# Patient Record
Sex: FEMALE | Race: WHITE | NOT HISPANIC OR LATINO | Employment: UNEMPLOYED | URBAN - METROPOLITAN AREA
[De-identification: names, ages, dates, MRNs, and addresses within clinical notes are randomized per-mention and may not be internally consistent; named-entity substitution may affect disease eponyms.]

---

## 2024-07-01 ENCOUNTER — APPOINTMENT (OUTPATIENT)
Dept: RADIOLOGY | Facility: CLINIC | Age: 44
End: 2024-07-01

## 2024-10-15 ENCOUNTER — OFFICE VISIT (OUTPATIENT)
Dept: RHEUMATOLOGY | Facility: CLINIC | Age: 44
End: 2024-10-15
Payer: COMMERCIAL

## 2024-10-15 VITALS
HEART RATE: 89 BPM | DIASTOLIC BLOOD PRESSURE: 84 MMHG | SYSTOLIC BLOOD PRESSURE: 147 MMHG | WEIGHT: 201 LBS | HEIGHT: 63 IN | BODY MASS INDEX: 35.61 KG/M2

## 2024-10-15 DIAGNOSIS — R76.8 POSITIVE ANA (ANTINUCLEAR ANTIBODY): Primary | ICD-10-CM

## 2024-10-15 DIAGNOSIS — M79.7 FIBROMYALGIA: ICD-10-CM

## 2024-10-15 PROCEDURE — 4004F PT TOBACCO SCREEN RCVD TLK: CPT | Performed by: INTERNAL MEDICINE

## 2024-10-15 PROCEDURE — 3008F BODY MASS INDEX DOCD: CPT | Performed by: INTERNAL MEDICINE

## 2024-10-15 PROCEDURE — 99204 OFFICE O/P NEW MOD 45 MIN: CPT | Performed by: INTERNAL MEDICINE

## 2024-10-15 RX ORDER — TRAZODONE HYDROCHLORIDE 150 MG/1
1 TABLET ORAL NIGHTLY
COMMUNITY
Start: 2024-09-19

## 2024-10-15 RX ORDER — FLUOXETINE HYDROCHLORIDE 40 MG/1
1 CAPSULE ORAL
COMMUNITY
Start: 2024-02-28

## 2024-10-15 RX ORDER — LANOLIN ALCOHOL/MO/W.PET/CERES
1000 CREAM (GRAM) TOPICAL DAILY
COMMUNITY

## 2024-10-15 RX ORDER — TIOTROPIUM BROMIDE INHALATION SPRAY 3.12 UG/1
SPRAY, METERED RESPIRATORY (INHALATION)
COMMUNITY

## 2024-10-15 RX ORDER — CLOBETASOL PROPIONATE 0.5 MG/G
CREAM TOPICAL DAILY
COMMUNITY

## 2024-10-15 RX ORDER — CIPROFLOXACIN 500 MG/1
1 TABLET ORAL
COMMUNITY
Start: 2024-07-15

## 2024-10-15 RX ORDER — LEVALBUTEROL INHALATION SOLUTION 0.63 MG/3ML
SOLUTION RESPIRATORY (INHALATION)
COMMUNITY
Start: 2024-09-01

## 2024-10-15 RX ORDER — TIZANIDINE HYDROCHLORIDE 4 MG/1
4 CAPSULE, GELATIN COATED ORAL 3 TIMES DAILY
Qty: 90 CAPSULE | Refills: 1 | Status: SHIPPED | OUTPATIENT
Start: 2024-10-15 | End: 2025-10-15

## 2024-10-15 RX ORDER — LEVOFLOXACIN 750 MG/1
1 TABLET ORAL
COMMUNITY
Start: 2024-08-26

## 2024-10-15 RX ORDER — FOLIC ACID 1 MG/1
1 TABLET ORAL DAILY
COMMUNITY

## 2024-10-15 RX ORDER — GABAPENTIN 300 MG/1
CAPSULE ORAL
COMMUNITY
Start: 2024-09-18

## 2024-10-15 RX ORDER — LISDEXAMFETAMINE DIMESYLATE 30 MG/1
1 CAPSULE ORAL EVERY MORNING
COMMUNITY
Start: 2023-05-22

## 2024-10-15 RX ORDER — SODIUM CHLORIDE FOR INHALATION 3 %
VIAL, NEBULIZER (ML) INHALATION
COMMUNITY
Start: 2024-08-30

## 2024-10-15 RX ORDER — METHYLPREDNISOLONE 4 MG/1
TABLET ORAL
COMMUNITY
Start: 2024-10-12

## 2024-10-15 ASSESSMENT — PAIN SCALES - GENERAL: PAINLEVEL: 5

## 2024-10-15 NOTE — PROGRESS NOTES
May be a candidate for opioid sparing infusion  History of Present Complaint:  The patient was referred to us by Referring Provider: ***. this is 44 y.o.  female {Accompanied by:[id number]}with a past history of obesity BMI 36, smoker, ELISHA, anxiety/depression, PTSD, ADHD on Vyvanse 40 mg, fibromyalgia, history of opioid use disorder on buprenorphine 8 mg and gabapentin 100 mg twice daily from [facility] presenting with {kt mrdica symptoms:74813}    Pain started {pain onset:27888}  Pain is {Better or worse:76264}   Patient history significant for the following red flags: {Red flags:38095}  The pain is described as {Pain description:08590} and is relieved by {pain relief:46991}      Prior Pain Therapies: {Prior pain therapy:76359}    Past surgical history:  {Past surgical history:39062}           Procedures:   *** the patient has had a ***% improvement in pain.    Portions of record reviewed for pertinent issues: active problem list, medication list, allergies, family history, social history, notes from last encounter, encounters, lab results, imaging and other available records.    I have personally reviewed the OARRS report for this patient. This report is scanned into the electronic medical record. I have considered the risks of abuse, dependence, addiction and diversion. It showed: Buprenorphine 8 mg and gabapentin 100 mg twice daily from [facility] and Vyvanse 40 mg from [facility]   OPIOID RISK ASSESSMENT SCORE ***/26  Opioid agreement: ***  Activities of daily living: ***  Adverse effects: ***  Analgesia: W/O ***/10, W ***/10  {***}  Toxicology screen: ***  Aberrant behavior: ***      Diagnostic studies:  ***      Employment/disability/litigation: {ktlitigation:97733}    Social History:  {KT social history:21442}       Review of Systems       Physical Exam       Assessment  ***           Plan  At least 50% of the visit was involved in the discussion of the options for treatment. We discussed exercises,  medication, interventional therapies and surgery. Healthy life style is essential with patient hard work to achieve the wellness. In addition; discussion with the patient and/or family about any of the diagnostic results, impressions and/or recommended diagnostic studies, prognosis, risks and benefits of treatment options, instructions for treatment and/or follow-up, importance of compliance with chosen treatment options, risk-factor reduction, and patient/family education.         Recommended Pool therapy, walking in the pool, at least 3x per week for 30 minutes  Recommend self-directed physical therapy with at least daily exercises for minimum of 20-minute, brochure was handed to the patient  *** Smoking cessation  Healthy lifestyle and anti-inflammatory diet in addition to weight control discussed with the patient  Alternative chronic pain therapies was discussed, encouraged and information was handed  Return to Clinic 3 months       *Please note this report has been produced using speech recognition software and may contain errors related to that system including grammar, punctuation and spelling as well as words and phrases that may be inappropriate. If there are questions or concerns, please feel free to contact me to clarify.    Gregoria Lake MD

## 2024-10-15 NOTE — PROGRESS NOTES
44 y.o.        female            CHIEF COMPLAINT: Generalized stiffness and pain    HPI: 44 yr old WF who dates her history back to 2 years back when she noticed generalized stiffness and diffuse pain. The symptoms have gotten worse for last few months.  She suffers from major depression. Saw, Dr. Cardona for positive DERRICK in a titer of 1: 80. NICKY and anti-DNA is negative.   She has frequent headaches. Has photophobia and phonophobia.   Also, has severe constipation.         PMH: Depression and anxiety  Opoid addiction  DJD of the spine  Fibromyalgia           Allergies   Allergen Reactions    Bupropion Hcl Rash     Other reaction(s): Mental Status Change    Pregabalin Other     Other reaction(s): GI Upset    Duloxetine Anxiety     Increased agitation    Lamotrigine Rash     Rsh on arms       Medication Documentation Review Audit       Reviewed by Odilia Bazan MA (Medical Assistant) on 10/15/24 at 0935      Medication Order Taking? Sig Documenting Provider Last Dose Status   ciprofloxacin (Cipro) 500 mg tablet [id number] Yes Take 1 tablet (500 mg) by mouth every 12 hours. Historical Provider, MD Taking Active   clobetasol (Temovate) 0.05 % cream [id number] Yes Apply topically once daily. Historical Provider, MD Taking Active   cyanocobalamin (Vitamin B-12) 1,000 mcg tablet [id number] Yes Take 1 tablet (1,000 mcg) by mouth once daily. Historical MD Javad Taking Active   FLUoxetine (PROzac) 40 mg capsule [id number] Yes Take 1 capsule (40 mg) by mouth early in the morning.. Historical MD Javad Taking Active   folic acid (Folvite) 1 mg tablet [id number] Yes Take 1 tablet (1 mg) by mouth once daily. Historical Provider, MD Taking Active   gabapentin (Neurontin) 300 mg capsule [id number] Yes Take one pill by mouth every afternoon and evening Historical Provider, MD Taking Active   levalbuterol (Xopenex) 0.63 mg/3 mL nebulizer solution [id number] Yes USE 3 ML VIA NEBULIZER TWO TIMES A DAY. INHALE OVER  5-15 MINUTES Debbie Farnsworth MD Taking Active   levoFLOXacin (Levaquin) 750 mg tablet [id number] Yes Take 1 tablet (750 mg) by mouth early in the morning.. Debbie Farnsworth MD Taking Active   methylPREDNISolone (Medrol Dospak) 4 mg tablets [id number] Yes TAKE 6 TABLETS ON DAY 1 AS DIRECTED ON PACKAGE AND DECREASE BY 1 TAB EACH DAY FOR A TOTAL OF 6 DAYS Debbie Farnsworth MD Taking Active   sodium chloride 3 % nebulizer solution [id number] Yes USE 2 ML VIA NEBULIZER TWO TIMES A DAY. USE AFTER ALBUTEROL NEBS Debbie Farnsworth MD Taking Active   Spiriva Respimat 2.5 mcg/actuation inhaler [id number] Yes INHALE 2 PUFFS BY MOUTH AS INSTRUCTED ONCE DAILY. Debbie Farnsworth MD Taking Active   traZODone (Desyrel) 150 mg tablet [id number] Yes Take 1 tablet (150 mg) by mouth once daily at bedtime. Debbie Farnsworth MD Taking Active   Vyvanse 30 mg capsule [id number] Yes Take 1 capsule (30 mg) by mouth once daily in the morning. Historical MD Javad Taking Active                      Family history: Aunt and grand mother Meinerre's disease.   Grandmother has RA      Social History     Tobacco Use    Smoking status: Every Day     Current packs/day: 0.50     Types: Cigarettes    Smokeless tobacco: Never         Review of Systems    REVIEW OF SYSTEMS:  Constitutional: C/O fatigue  Skin:  No psoriasis or photosensitvity  Head: No headache, oral ulcers or hair loss  Neck: No difficulty swallowing or choking  Eyes: No dry eyes or iritis  Mouth: Has dry mouth  but no oral ulcers  Pulmonary: No wheezing, pleurisy or SOB  Cardiovascular: No chest pain or palpitations  Gastrointestinal: As H/P  Endocrine: No Raynaud's   Musculoskeletal: As H/P    All 10 review of systems negative      PHYSICAL EXAM:  Visit Vitals  /84   Pulse 89     General - NAD, sitting up in chair, well-groomed, pleasant, AAOx3  Head: Normocephalic, atraumatic  Eyes - PERRLA, EOMI. No conjunctiva injection.   Mouth/ENT - Moist oral and nasal  mucosa. No facial rash. Enlarged parotid gland. Adequate salivary pooling.  Cardiovascular - Normal S1, S2. Regular rate and rhythm. No murmurs or rubs.  Lungs - Symmetric chest expansion. Clear to auscultation bilaterally.   Skin - No rashes or ulcers. Skin warm and dry. No erythema on bilateral cheeks.  Abdomen - Soft, non-tender. No masses. Normal bowel sounds.  Extremities - No edema, cyanosis ,or clubbing  Neurological - Alert and oriented x 3,  grossly intact. No focal deficit.  Musculoskeletal -  EXAMJOINTDETAILED,  Shoulders: Full ROM, without pain, no swelling, warmth or tenderness.  Elbows: Full ROM, without pain, no swelling, warmth or tenderness.  Wrists: Full ROM, without pain, no swelling, warmth or tenderness.  MCP: No swelling, warmth or tenderness. Metacarpal squeeze negative  PIP: No swelling, warmth or tenderness.  DIP: No swelling, warmth or tenderness.  Hands : 5/5.                 Assessment/Plan: 44 yr old with symptoms suggestive of FMS and cental sensitization.   She cannot tolerate Lyrica and Cymbalta. Will refer to Pain for infusions. Start on Tizanidine.  I check labs to rule out autoimmune process like Sjogren's but my suspicion is low.       Lyn Small MD

## 2024-10-24 ENCOUNTER — APPOINTMENT (OUTPATIENT)
Dept: PAIN MEDICINE | Facility: CLINIC | Age: 44
End: 2024-10-24
Payer: COMMERCIAL

## 2025-01-30 ENCOUNTER — OFFICE VISIT (OUTPATIENT)
Dept: ORTHOPEDIC SURGERY | Facility: CLINIC | Age: 45
End: 2025-01-30
Payer: COMMERCIAL

## 2025-01-30 ENCOUNTER — HOSPITAL ENCOUNTER (OUTPATIENT)
Dept: RADIOLOGY | Facility: CLINIC | Age: 45
Discharge: HOME | End: 2025-01-30
Payer: COMMERCIAL

## 2025-01-30 VITALS — BODY MASS INDEX: 35.44 KG/M2 | HEIGHT: 63 IN | WEIGHT: 200 LBS

## 2025-01-30 DIAGNOSIS — M54.2 NECK PAIN: ICD-10-CM

## 2025-01-30 DIAGNOSIS — M54.12 CERVICAL RADICULOPATHY: Primary | ICD-10-CM

## 2025-01-30 PROCEDURE — 99204 OFFICE O/P NEW MOD 45 MIN: CPT | Performed by: ORTHOPAEDIC SURGERY

## 2025-01-30 PROCEDURE — 72050 X-RAY EXAM NECK SPINE 4/5VWS: CPT

## 2025-01-30 PROCEDURE — 3008F BODY MASS INDEX DOCD: CPT | Performed by: ORTHOPAEDIC SURGERY

## 2025-01-30 PROCEDURE — 99214 OFFICE O/P EST MOD 30 MIN: CPT | Performed by: ORTHOPAEDIC SURGERY

## 2025-01-30 ASSESSMENT — PAIN SCALES - GENERAL: PAINLEVEL_OUTOF10: 6

## 2025-01-30 ASSESSMENT — PAIN - FUNCTIONAL ASSESSMENT: PAIN_FUNCTIONAL_ASSESSMENT: 0-10
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
## 2025-02-04 NOTE — PROGRESS NOTES
No chief complaint on file.    History of Present Complaint:  The patient was referred to us by Referring Provider: Julio Solis MD . this is 44 y.o.  female  with a past history of anxiety/depression, PTSD and nightmares, ADHD/OCD on Vyvanse 40 mg, involved with behavioral health Dr. Kanner at [facility], polysubstance abuse, smoker, obesity BMI 37, fibromyalgia on gabapentin 300 mg 3 times daily opioid use disorder on buprenorphine 8 mg twice daily  presenting with Neck pain    Pain started *** due to {pain onset:13176}  Pain is {Better or worse:40051} {DESC; BETTER/WORSE:56630}     The pain is described as {Pain description:47737} and is relieved by {pain relief:77426}      Prior Pain Therapies: {Prior pain therapy:87280}  Past surgical history:  {Past surgical history:05105}        Employment/disability/litigation: {ktlitigation:26180}  Social history: {KT social history:28758}    Diagnostic studies:  xrays     Shyam Each Box that Applies Female Male   FAMILY HISTORY OF SUBSTANCE ABUSE  Shyam the boxes that applies   Alcohol ?  1    ? 3   Illegal drugs ?  2 ? 3   Rx drugs ?  4 ? 4   PERSONAL HISTORY OF SUBSTNACE ABUSE   Alcohol ?  3 ?  3   Illegal drugs ?  4 ?  4    Rx drugs ?  5 ?  5   Age Between 16-45 years ?  1 ?  1   History of Preadolescent Sexual Abuse ?  3 ?  0   PSYCHOLOGIC DISEASE   ADD, OCD, bipolar, schizophrenia   ?  2 ?  2   Depression ?  1 ?  1   Scoring Totals       Scoring (Risk)  0-3 - Low  4-7 - Moderate  8 - High  Opioid Risk Assessment Score ***/26
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
## 2025-02-06 ENCOUNTER — APPOINTMENT (OUTPATIENT)
Dept: PAIN MEDICINE | Facility: CLINIC | Age: 45
End: 2025-02-06
Payer: COMMERCIAL

## 2025-02-13 ENCOUNTER — OFFICE VISIT (OUTPATIENT)
Dept: PAIN MEDICINE | Facility: CLINIC | Age: 45
End: 2025-02-13
Payer: COMMERCIAL

## 2025-02-13 VITALS
SYSTOLIC BLOOD PRESSURE: 173 MMHG | OXYGEN SATURATION: 99 % | HEART RATE: 75 BPM | WEIGHT: 194 LBS | TEMPERATURE: 97.2 F | RESPIRATION RATE: 18 BRPM | HEIGHT: 63 IN | DIASTOLIC BLOOD PRESSURE: 97 MMHG | BODY MASS INDEX: 34.38 KG/M2

## 2025-02-13 DIAGNOSIS — M54.2 NECK PAIN: ICD-10-CM

## 2025-02-13 DIAGNOSIS — M54.12 CERVICAL RADICULOPATHY: ICD-10-CM

## 2025-02-13 DIAGNOSIS — M79.7 FIBROMYALGIA: ICD-10-CM

## 2025-02-13 PROCEDURE — 99204 OFFICE O/P NEW MOD 45 MIN: CPT | Performed by: ANESTHESIOLOGY

## 2025-02-13 PROCEDURE — 99214 OFFICE O/P EST MOD 30 MIN: CPT | Performed by: ANESTHESIOLOGY

## 2025-02-13 PROCEDURE — 3008F BODY MASS INDEX DOCD: CPT | Performed by: ANESTHESIOLOGY

## 2025-02-13 RX ORDER — BUPRENORPHINE AND NALOXONE 8; 2 MG/1; MG/1
FILM, SOLUBLE BUCCAL; SUBLINGUAL
COMMUNITY
Start: 2016-12-08

## 2025-02-13 RX ORDER — LIDOCAINE 50 MG/G
1 PATCH TOPICAL DAILY
Qty: 30 PATCH | Refills: 0 | Status: SHIPPED | OUTPATIENT
Start: 2025-02-13

## 2025-02-13 RX ORDER — GABAPENTIN 400 MG/1
400 CAPSULE ORAL EVERY 6 HOURS
Qty: 120 CAPSULE | Refills: 1 | Status: SHIPPED | OUTPATIENT
Start: 2025-02-13 | End: 2025-04-14

## 2025-02-13 SDOH — ECONOMIC STABILITY: FOOD INSECURITY: WITHIN THE PAST 12 MONTHS, YOU WORRIED THAT YOUR FOOD WOULD RUN OUT BEFORE YOU GOT MONEY TO BUY MORE.: NEVER TRUE

## 2025-02-13 SDOH — ECONOMIC STABILITY: FOOD INSECURITY: WITHIN THE PAST 12 MONTHS, THE FOOD YOU BOUGHT JUST DIDN'T LAST AND YOU DIDN'T HAVE MONEY TO GET MORE.: NEVER TRUE

## 2025-02-13 ASSESSMENT — ENCOUNTER SYMPTOMS
ADENOPATHY: 0
SHORTNESS OF BREATH: 1
NECK PAIN: 1
NUMBNESS: 1
DIFFICULTY URINATING: 0
DEPRESSION: 1
OCCASIONAL FEELINGS OF UNSTEADINESS: 1
LOSS OF SENSATION IN FEET: 1
FEVER: 0
BACK PAIN: 1
EYE PAIN: 0
BLOOD IN STOOL: 0

## 2025-02-13 ASSESSMENT — PATIENT HEALTH QUESTIONNAIRE - PHQ9
SUM OF ALL RESPONSES TO PHQ QUESTIONS 1-9: 21
1. LITTLE INTEREST OR PLEASURE IN DOING THINGS: NEARLY EVERY DAY
5. POOR APPETITE OR OVEREATING: NEARLY EVERY DAY
8. MOVING OR SPEAKING SO SLOWLY THAT OTHER PEOPLE COULD HAVE NOTICED. OR THE OPPOSITE, BEING SO FIGETY OR RESTLESS THAT YOU HAVE BEEN MOVING AROUND A LOT MORE THAN USUAL: NOT AT ALL
9. THOUGHTS THAT YOU WOULD BE BETTER OFF DEAD, OR OF HURTING YOURSELF: NOT AT ALL
4. FEELING TIRED OR HAVING LITTLE ENERGY: NEARLY EVERY DAY
SUM OF ALL RESPONSES TO PHQ9 QUESTIONS 1 AND 2: 6
6. FEELING BAD ABOUT YOURSELF - OR THAT YOU ARE A FAILURE OR HAVE LET YOURSELF OR YOUR FAMILY DOWN: NEARLY EVERY DAY
2. FEELING DOWN, DEPRESSED OR HOPELESS: NEARLY EVERY DAY
3. TROUBLE FALLING OR STAYING ASLEEP OR SLEEPING TOO MUCH: NEARLY EVERY DAY
7. TROUBLE CONCENTRATING ON THINGS, SUCH AS READING THE NEWSPAPER OR WATCHING TELEVISION: NEARLY EVERY DAY

## 2025-02-13 ASSESSMENT — COLUMBIA-SUICIDE SEVERITY RATING SCALE - C-SSRS
6. HAVE YOU EVER DONE ANYTHING, STARTED TO DO ANYTHING, OR PREPARED TO DO ANYTHING TO END YOUR LIFE?: NO
1. IN THE PAST MONTH, HAVE YOU WISHED YOU WERE DEAD OR WISHED YOU COULD GO TO SLEEP AND NOT WAKE UP?: NO
2. HAVE YOU ACTUALLY HAD ANY THOUGHTS OF KILLING YOURSELF?: NO

## 2025-02-13 ASSESSMENT — LIFESTYLE VARIABLES
HOW MANY STANDARD DRINKS CONTAINING ALCOHOL DO YOU HAVE ON A TYPICAL DAY: PATIENT DOES NOT DRINK
AUDIT-C TOTAL SCORE: 0
SKIP TO QUESTIONS 9-10: 1
TOTAL SCORE: 18
HOW OFTEN DO YOU HAVE SIX OR MORE DRINKS ON ONE OCCASION: NEVER
HOW OFTEN DO YOU HAVE A DRINK CONTAINING ALCOHOL: NEVER

## 2025-02-13 ASSESSMENT — PAIN SCALES - GENERAL
PAINLEVEL_OUTOF10: 6
PAINLEVEL_OUTOF10: 6

## 2025-02-13 ASSESSMENT — PAIN DESCRIPTION - DESCRIPTORS: DESCRIPTORS: ACHING

## 2025-02-13 ASSESSMENT — PAIN - FUNCTIONAL ASSESSMENT: PAIN_FUNCTIONAL_ASSESSMENT: 0-10

## 2025-02-13 NOTE — PROGRESS NOTES
"Chief Complain    New Patient Visit (For generalized body pain that's been going in for 10+ years and have been getting progressively worse. Deny neck and back surgery. B/l hip and knee pain. Have images on file at [facility], some at . Is having GI issues. Is currently on suboxone. Have an /MRI coming up. Was referred my Dr. Cifuentes for pain mangement.)    History Of Present Illness  Luana Burgos is a 44 y.o. female here for evaluation of neck pain and low back pain, radiating to bilateral shoulder. The patient has been experiencing these symptoms for last few years. The patient describes the pain as dull, \"bone pain\". The patient's current pain score is 5-6 on a scale from 0-10. The pain is worsened by  looking down  and is alleviated by nothing relieves the pain. Since the start of the symptoms the pain has been worse.    The patient denies any fever, chills, weight loss, weakness, numbness, bladder/ bowel incontinence, history of cancer, history of IV drug abuse, recent trauma.    Past Medical History  HTN  Hiatal Hernia  Interstitial lung disease  Psoriasis    Surgical History  Finger Surgeries    Social History  She reports that she has been smoking cigarettes. She has never used smokeless tobacco. No history on file for alcohol use and drug use.    Family History  Non contributory      Allergies  Bupropion hcl, Pregabalin, Duloxetine, and Lamotrigine    Review of Systems  Review of Systems   Constitutional:  Negative for fever.   HENT:  Negative for ear pain.    Eyes:  Negative for pain.   Respiratory:  Positive for shortness of breath.    Cardiovascular:  Negative for chest pain.   Gastrointestinal:  Negative for blood in stool.   Endocrine: Negative for cold intolerance.   Genitourinary:  Negative for difficulty urinating.   Musculoskeletal:  Positive for back pain and neck pain.   Skin:  Negative for rash.   Allergic/Immunologic: Negative for food allergies.   Neurological:  Positive for numbness. " "  Hematological:  Negative for adenopathy.   Psychiatric/Behavioral:  Negative for suicidal ideas.         Physical Exam  Physical Exam  Constitutional:       Appearance: Normal appearance.   HENT:      Head: Normocephalic and atraumatic.   Eyes:      Extraocular Movements: Extraocular movements intact.      Pupils: Pupils are equal, round, and reactive to light.   Cardiovascular:      Rate and Rhythm: Normal rate and regular rhythm.   Pulmonary:      Effort: Pulmonary effort is normal.   Abdominal:      Palpations: Abdomen is soft.   Musculoskeletal:      Cervical back: Neck supple.   Skin:     General: Skin is warm.   Neurological:      Mental Status: She is alert and oriented to person, place, and time.      Motor: Motor function is intact.      Coordination: Coordination is intact.      Gait: Gait is intact.      Deep Tendon Reflexes:      Reflex Scores:       Tricep reflexes are 2+ on the right side and 2+ on the left side.       Bicep reflexes are 2+ on the right side and 2+ on the left side.       Brachioradialis reflexes are 2+ on the right side and 2+ on the left side.       Patellar reflexes are 2+ on the right side and 2+ on the left side.       Achilles reflexes are 2+ on the right side and 2+ on the left side.  Psychiatric:         Mood and Affect: Mood normal.         Behavior: Behavior normal.           Last Recorded Vitals  Blood pressure (!) 173/97, pulse 75, temperature 36.2 °C (97.2 °F), resp. rate 18, height 1.6 m (5' 3\"), weight 88 kg (194 lb), SpO2 99%.    Reviewed Images  Reviewed and independently interpreted Xray x-ray cervical spine reveals loss of cervical lordosis, anterior osteophytosis at C4, C5, C6       Assessment/Plan   Encounter Diagnoses   Name Primary?    Fibromyalgia     Neck pain     Cervical radiculopathy         Luana Burgos is a 44 y.o. female here for evaluation of chronic neck pain radiating to bilateral shoulders and bilateral upper extremities.  She has been experiencing " the symptoms last several years or so.  Has a past medical history significant of interstitial lung disease, hypertension, substance use disorder for opioids currently on Suboxone.  Review of her recently done x-ray of cervical spine does reveal significant changes involving anterior osteophytosis at C4, 5 and 6 and loss of cervical lordosis.  On physical examination she does not have any focal neurological deficit.  Currently she is enrolled in physical therapy and also has an MRI of her cervical spine scheduled.  She is managing her pain with gabapentin 300 mg 3 times daily and 200 mg in the morning with modest benefit.  I would increase the gabapentin gradually to 400 mg every 6 hours as needed.  Would also add 5% lidocaine patch.  Counseled her extensively about importance of smoking cessation and healthy lifestyle.  Follow-up after completion of MRI cervical spine.        Jorge Diggs MD

## 2025-02-19 ENCOUNTER — HOSPITAL ENCOUNTER (OUTPATIENT)
Dept: RADIOLOGY | Facility: CLINIC | Age: 45
Discharge: HOME | End: 2025-02-19
Payer: COMMERCIAL

## 2025-02-19 DIAGNOSIS — M54.12 CERVICAL RADICULOPATHY: ICD-10-CM

## 2025-02-19 DIAGNOSIS — M54.2 NECK PAIN: ICD-10-CM

## 2025-02-19 PROCEDURE — 72141 MRI NECK SPINE W/O DYE: CPT

## 2025-02-20 ENCOUNTER — PATIENT MESSAGE (OUTPATIENT)
Dept: PAIN MEDICINE | Facility: CLINIC | Age: 45
End: 2025-02-20
Payer: COMMERCIAL

## 2025-02-20 DIAGNOSIS — M54.12 CERVICAL RADICULOPATHY: ICD-10-CM

## 2025-02-20 DIAGNOSIS — M79.7 FIBROMYALGIA: Primary | ICD-10-CM

## 2025-02-21 ENCOUNTER — APPOINTMENT (OUTPATIENT)
Dept: ORTHOPEDIC SURGERY | Facility: CLINIC | Age: 45
End: 2025-02-21
Payer: COMMERCIAL

## 2025-03-03 RX ORDER — METHOCARBAMOL 500 MG/1
500 TABLET, FILM COATED ORAL 3 TIMES DAILY PRN
Qty: 30 TABLET | Refills: 0 | Status: SHIPPED | OUTPATIENT
Start: 2025-03-03 | End: 2025-03-13

## 2025-03-03 RX ORDER — NALOXONE HYDROCHLORIDE 4 MG/.1ML
1 SPRAY NASAL AS NEEDED
Qty: 2 EACH | Refills: 0 | Status: SHIPPED | OUTPATIENT
Start: 2025-03-03

## 2025-03-07 ENCOUNTER — OFFICE VISIT (OUTPATIENT)
Dept: ORTHOPEDIC SURGERY | Facility: CLINIC | Age: 45
End: 2025-03-07
Payer: COMMERCIAL

## 2025-03-07 DIAGNOSIS — M54.12 CERVICAL RADICULOPATHY: Primary | ICD-10-CM

## 2025-03-07 PROCEDURE — 99215 OFFICE O/P EST HI 40 MIN: CPT | Performed by: ORTHOPAEDIC SURGERY

## 2025-03-07 NOTE — PROGRESS NOTES
Luana Burgos is a 44 y.o. female who presents for Follow-up of the Neck (MRI Review/MRI@ 2/19/25).    HPI:  44-year-old female here for follow-up of neck MRI results.  She denies any fever chills nausea vomiting night sweats.  She has no bowel or bladder complaints.    Physical exam:  Well-nourished, well kept.  No lymphangitis or lymphadenopathy in the examined extremities. Affect normal.  Alert and oriented X 3.  Coordination normal.  Patient can rise from a seated position, can sit from a standing position. Can stand on heels and toes with a lot of difficulty.  Patient is tender in the paraspinal musculature of the cervical spine. range of motion is mildly decreased secondary to some pain and stiffness no weakness no instability to muscle strength. examination of the upper extremities reveals no point tenderness, swelling, or deformity.  Range of motion of the shoulders, elbows, wrists, and fingers are full without crepitance, instability, or exacerbation of pain. Strength is diffusely weak through the upper extremity bilaterally, no specific weakness in any single muscle group. no redness, abrasions, or lesions on the upper extremities bilaterally.  Gross sensation intact to the extremities.  Deep tendon reflexes 1+ and symmetric bilaterally.  [place] negative.     Imaging studies:  An MRI of the cervical spine was performed on February 19, 2025.  We reviewed that.    Assessment:  44-year-old female here for follow-up cervical MRI results.  See previous notes for details.  She is having a lot of posterior neck pain and parascapular pain.  She is getting some left arm radicular pain and numbness as well.  She recently saw her pain management physician that added some medication such as Robaxin and lidocaine patches and increased her gabapentin.  She feels like this is helping a little bit but that it may just be masking the pain.  She has some diffuse weakness throughout the upper extremity, a lot of this is a  pain guarding response.  She has systemic pain and she has had this all over her body for years.  We were working her up for her neck and upper extremity radicular symptoms.  She has not yet done physical therapy for her neck, she is setting that up now.  She has got some central and left-sided stenosis at C5-6 with a large bulging disc and degenerative level, to a lesser degree C6-7 as well.    We have reviewed tests today, MRI.  She is here with a family member who is a helpful and necessary historian.  I reviewed her pain management notes from Dr. Diggs from February 13, 2025, this discusses her increased medication.  This is an exacerbation of a chronic problem that is severely inhibiting her bodily function.  We did consider and discuss surgery today    For complete plan and/or surgical details, please refer to Dr. Solis's portion of this split dictation.    -Juan Nava PA-C    In a face-to-face encounter, I performed a history and physical examination, discussed pertinent diagnostic studies if indicated, and discussed diagnosis and management strategies with both the patient and the midlevel provider.  I reviewed the midlevel's note and agree with the documented findings and plan of care.    Neck pain left arm radiculopathy.  The patient has a milieu of other conditions she does have a history of subs abuse but she been clean for quite some time now.  She has degenerative disc at C5-6 and C6-7 and a little bit at C4-5.  We had an MRI of her cervical spine.  The MRI does not show any stenosis or significant abnormalities at C4-5 but there is some stenosis at C5-6 and C6-7 and degenerative change those levels.  The main stenosis at C5-6 but there is some at C6-7 as well.    Assessment/plan: Patient with left arm radiculopathy and neck pain likely coming from C5-6 and C6-7.  At some point we could perform an ACDF.  We did discuss and consider that today.  She is severely inhibited bodily function as  she has complete total body pain and she has been seeing pain management and neurology for that.  Neurology's been working her up in the getting some success.  I think it is too premature for us to proceed on with surgery at this point.  I like to get her into some physical therapy and we will see her back in 2 months.  If she is no better at that point we could consider an operation on her which be a C5-6 C6-7 ACDF.  However, she needs to understand that she has a high risk of failure given her prior history of substance abuse as well as her other chronic pain issues.    Julio Solis MD  Orthopedic surgery

## 2025-03-12 DIAGNOSIS — M54.12 CERVICAL RADICULOPATHY: ICD-10-CM

## 2025-03-12 DIAGNOSIS — M54.2 NECK PAIN: ICD-10-CM

## 2025-03-17 ENCOUNTER — APPOINTMENT (OUTPATIENT)
Dept: ORTHOPEDIC SURGERY | Facility: CLINIC | Age: 45
End: 2025-03-17
Payer: COMMERCIAL

## 2025-04-07 ENCOUNTER — APPOINTMENT (OUTPATIENT)
Dept: ORTHOPEDIC SURGERY | Facility: CLINIC | Age: 45
End: 2025-04-07
Payer: COMMERCIAL

## 2025-04-15 DIAGNOSIS — M54.2 NECK PAIN: ICD-10-CM

## 2025-04-15 DIAGNOSIS — M54.12 CERVICAL RADICULOPATHY: ICD-10-CM

## 2025-04-15 RX ORDER — GABAPENTIN 400 MG/1
400 CAPSULE ORAL EVERY 6 HOURS
Qty: 120 CAPSULE | Refills: 1 | Status: SHIPPED | OUTPATIENT
Start: 2025-04-15 | End: 2025-06-14

## 2025-04-18 ENCOUNTER — APPOINTMENT (OUTPATIENT)
Dept: GASTROENTEROLOGY | Facility: CLINIC | Age: 45
End: 2025-04-18
Payer: COMMERCIAL

## 2025-05-09 ENCOUNTER — APPOINTMENT (OUTPATIENT)
Dept: ORTHOPEDIC SURGERY | Facility: CLINIC | Age: 45
End: 2025-05-09
Payer: COMMERCIAL

## 2025-05-14 ENCOUNTER — APPOINTMENT (OUTPATIENT)
Facility: CLINIC | Age: 45
End: 2025-05-14
Payer: COMMERCIAL

## 2025-05-29 ENCOUNTER — APPOINTMENT (OUTPATIENT)
Dept: CARDIOLOGY | Facility: CLINIC | Age: 45
End: 2025-05-29
Payer: COMMERCIAL

## 2025-05-29 VITALS
SYSTOLIC BLOOD PRESSURE: 160 MMHG | TEMPERATURE: 97.2 F | WEIGHT: 194.2 LBS | BODY MASS INDEX: 34.41 KG/M2 | DIASTOLIC BLOOD PRESSURE: 90 MMHG | HEIGHT: 63 IN | HEART RATE: 90 BPM

## 2025-05-29 DIAGNOSIS — I10 ESSENTIAL (PRIMARY) HYPERTENSION: ICD-10-CM

## 2025-05-29 DIAGNOSIS — J98.4 MULTIPLE IDIOPATHIC PULMONARY CYSTS: ICD-10-CM

## 2025-05-29 DIAGNOSIS — R00.2 PALPITATIONS: ICD-10-CM

## 2025-05-29 DIAGNOSIS — R05.3 CHRONIC COUGH: ICD-10-CM

## 2025-05-29 DIAGNOSIS — R07.89 ATYPICAL CHEST PAIN: ICD-10-CM

## 2025-05-29 DIAGNOSIS — R06.02 SHORTNESS OF BREATH: Primary | ICD-10-CM

## 2025-05-29 PROBLEM — R60.0 LOCALIZED EDEMA: Status: ACTIVE | Noted: 2025-05-29

## 2025-05-29 PROBLEM — K59.09 CHRONIC CONSTIPATION: Status: ACTIVE | Noted: 2025-05-29

## 2025-05-29 PROCEDURE — 3077F SYST BP >= 140 MM HG: CPT | Performed by: INTERNAL MEDICINE

## 2025-05-29 PROCEDURE — 99205 OFFICE O/P NEW HI 60 MIN: CPT | Performed by: INTERNAL MEDICINE

## 2025-05-29 PROCEDURE — 93000 ELECTROCARDIOGRAM COMPLETE: CPT | Performed by: INTERNAL MEDICINE

## 2025-05-29 PROCEDURE — 3008F BODY MASS INDEX DOCD: CPT | Performed by: INTERNAL MEDICINE

## 2025-05-29 PROCEDURE — 3080F DIAST BP >= 90 MM HG: CPT | Performed by: INTERNAL MEDICINE

## 2025-05-29 RX ORDER — LOSARTAN POTASSIUM 25 MG/1
25 TABLET ORAL
COMMUNITY
Start: 2025-01-31 | End: 2025-05-29 | Stop reason: WASHOUT

## 2025-05-29 RX ORDER — VILAZODONE HYDROCHLORIDE 40 MG/1
TABLET ORAL
COMMUNITY

## 2025-05-29 RX ORDER — LISDEXAMFETAMINE DIMESYLATE 40 MG/1
CAPSULE ORAL
COMMUNITY

## 2025-05-29 RX ORDER — NALOXEGOL OXALATE 25 MG/1
TABLET, FILM COATED ORAL
COMMUNITY
Start: 2024-10-22 | End: 2025-05-29 | Stop reason: WASHOUT

## 2025-05-29 RX ORDER — LEVALBUTEROL TARTRATE 45 UG/1
AEROSOL, METERED ORAL
COMMUNITY

## 2025-05-29 RX ORDER — ASPIRIN 325 MG
50000 TABLET, DELAYED RELEASE (ENTERIC COATED) ORAL WEEKLY
COMMUNITY
Start: 2023-11-21

## 2025-05-29 RX ORDER — DULOXETIN HYDROCHLORIDE 30 MG/1
CAPSULE, DELAYED RELEASE ORAL
COMMUNITY
Start: 2025-03-20

## 2025-05-29 RX ORDER — FLUTICASONE PROPIONATE 50 MCG
SPRAY, SUSPENSION (ML) NASAL
COMMUNITY

## 2025-05-29 RX ORDER — METOPROLOL SUCCINATE 25 MG/1
25 TABLET, EXTENDED RELEASE ORAL
COMMUNITY
Start: 2025-03-06 | End: 2025-09-02

## 2025-05-29 RX ORDER — ERGOCALCIFEROL 1.25 MG/1
CAPSULE ORAL
COMMUNITY
End: 2025-05-29 | Stop reason: WASHOUT

## 2025-05-29 RX ORDER — LOSARTAN POTASSIUM AND HYDROCHLOROTHIAZIDE 12.5; 5 MG/1; MG/1
1 TABLET ORAL DAILY
Qty: 90 TABLET | Refills: 1 | Status: SHIPPED | OUTPATIENT
Start: 2025-05-29 | End: 2025-11-25

## 2025-05-29 ASSESSMENT — ENCOUNTER SYMPTOMS
STRIDOR: 0
ABDOMINAL DISTENTION: 1
LIGHT-HEADEDNESS: 1
WEAKNESS: 1
BRUISES/BLEEDS EASILY: 0
WHEEZING: 0
FATIGUE: 1
UNEXPECTED WEIGHT CHANGE: 1
DIZZINESS: 1
ACTIVITY CHANGE: 1
NERVOUS/ANXIOUS: 1
DIFFICULTY URINATING: 1
BACK PAIN: 1
COUGH: 1
SHORTNESS OF BREATH: 1

## 2025-05-29 NOTE — PROGRESS NOTES
CARDIOLOGY NEW PATIENT OFFICE VISIT    Patient:  Luana Burgos  YOB: 1980  MRN: [id number]       Chief Complaint/Active Symptoms:       Luana Burgos is a 44 y.o. female who is being seen today at the request of Dr. Rodriguez for evaluation of possible heart failure.    Chief Complaint   Patient presents with    Possible Heart Failure       History of Present Illness:   HPI    Was told by doctor 5 years ago when she had pitting edema that she was in the early stages of heart failure.    Now she feels like her legs are atrophying and has occasional pitting edema. Also has progressive hair loss, feels a vice  on her chest 24 hours per day. Has extreme fatigue. Has shortness of breath when laying on her back as she feels she has a weight on her chest when she does that. Has SOB one exertion but if she is at bed rest for a few days she feels SOB when she is active. No clear orthpnea or PND.     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 Stopped using in 2016.   Allergies:     Allergies[1]     Outpatient Medications:     Current Outpatient Medications   Medication Instructions    buprenorphine-naloxone (Suboxone) 8-2 mg SL film     cholecalciferol (VITAMIN D-3) 50,000 Units, Weekly    clobetasol (Temovate) 0.05 % cream Daily    cyanocobalamin (VITAMIN B-12) 1,000 mcg, Daily    DULoxetine (Cymbalta) 30 mg DR capsule 1 cap every day.  May incr to 60 mg (2 capsules). If more effective, call office to make this increase permanent.    fluticasone (Flonase) 50 mcg/actuation nasal spray PLACE 1 SPRAY IN BOTH NOSTRILS 2 (TWO) TIMES A DAY    folic acid (FOLVITE) 1 mg, Daily    gabapentin (NEURONTIN) 400 mg, oral, Every 6 hours    levalbuterol (Xopenex) 45 mcg/actuation inhaler INHALE 1 TO 2 PUFFS BY MOUTH EVERY 4 HOURS AS NEEDED FOR WHEEZE    lidocaine (Lidoderm) 5 % patch 1 patch, transdermal, Daily, Remove & discard patch within 12 hours or as directed by MD.    losartan  (COZAAR) 25 mg, Daily RT    methocarbamol (ROBAXIN) 500 mg, oral, 3 times daily PRN    metoprolol succinate XL (TOPROL-XL) 25 mg, Daily before breakfast    naloxone (NARCAN) 4 mg, nasal, As needed, May repeat every 2-3 minutes if needed, alternating nostrils, until medical assistance becomes available.    sodium chloride 3 % nebulizer solution USE 2 ML VIA NEBULIZER TWO TIMES A DAY. USE AFTER ALBUTEROL NEBS    Spiriva Respimat 2.5 mcg/actuation inhaler INHALE 2 PUFFS BY MOUTH AS INSTRUCTED ONCE DAILY.    traZODone (Desyrel) 150 mg tablet 1 tablet, Nightly    vilazodone (Viibryd) 40 mg tablet oral    Vyvanse 40 mg capsule TAKE 1 CAPSULE BY MOUTH EVERY MORNING PATIENT NEEDS BRAND NAME DUE TO INTOLERANCE TO GENERIC.        Past Medical History:     Medical History[2]    Social History:     Social History[3]    Family History:      Family History[4]    Review of Systems:     Review of Systems   Constitutional:  Positive for activity change, fatigue and unexpected weight change.   HENT:  Positive for congestion.    Respiratory:  Positive for cough (dark colored sputum) and shortness of breath. Negative for wheezing and stridor.    Cardiovascular:  Positive for leg swelling. Negative for chest pain.   Gastrointestinal:  Positive for abdominal distention.   Genitourinary:  Positive for difficulty urinating (foul odor).   Musculoskeletal:  Positive for back pain.   Neurological:  Positive for dizziness, weakness and light-headedness. Negative for syncope.   Hematological:  Does not bruise/bleed easily.   Psychiatric/Behavioral:  The patient is nervous/anxious.    All other systems reviewed and are negative.      Objective:     Vitals:    05/29/25 1325   BP: 160/90   Pulse: 90   Temp: 36.2 °C (97.2 °F)     /90 (05/29/25 1325)    Temp 36.2 °C (97.2 °F) (05/29/25 1325)    Pulse 90 (05/29/25 1325)   Resp      SpO2          Vitals:    05/29/25 1325   Weight: 88.1 kg (194 lb 3.2 oz)       Physical Examination:   GENERAL:   "Well developed, well nourished, in no acute distress.  HEENT: NC AT EOMI with anicteric sclera  NECK:  Supple, no JVD, no bruit.  CHEST:  Symmetric and nontender.  LUNGS:  Normal respiratory effort. Diminished air entry all lung fields.    HEART:  PMI nondisplaced. Rate and rhythm regular with no evident murmur, no gallop appreciated. There are no rubs, clicks or heaves.  PERIPHERAL VASCULAR:  Pulses present and equally palpable; 2+ throughout.  ABDOMEN: Soft, NT, ND without HSM or palpable organomegaly, no bruits, hypoactive bowel sounds  MUSCULOSKELETAL: No significant joint or limb deformity  EXTREMITIES:  Warm with good color, no clubbing or cyanosis.  There is no edema noted.  NEURO/PSYCH:  Alert and oriented times three with approppriate behavior and responses.  LYMPH: no significant palpable lymphadenopathy  SKIN: Small patches of erythema on extensor surfaces.      Lab:     CBC:   No results found for: \"WBC\", \"RBC\", \"HGB\", \"HCT\", \"PLT\"   No results found for: \"WBC\", \"RBC\", \"HGB\", \"HCT\", \"MCV\", \"MCH\", \"MCHC\", \"RDW\", \"PLT\", \"MPV\"    CMP:    No results found for: \"NA\", \"K\", \"CL\", \"CO2\", \"BUN\", \"CREATININE\", \"AGRATIO\", \"GLUCOSE\", \"GLU\", \"CALCIUM\"  No results found for: \"NA\", \"K\", \"CL\", \"CO2\", \"BUN\", \"CREATININE\", \"GLU\"  No results found for: \"ALKPHOS\", \"ALT\", \"AST\", \"PROT\", \"BILITOT\", \"BILIDIR\"    Magnesium:    No results found for: \"MG\"    Lipid Profile:    No results found for: \"CHLPL\", \"TRIG\", \"HDL\", \"LDLCALC\", \"LDLDIRECT\"    TSH:    No results found for: \"TSH\"    BNP:   No results found for: \"BNP\"     PT/INR:    No results found for: \"PROTIME\", \"INR\"    HgBA1c:    No results found for: \"HGBA1C\"    Cardiac Enzymes:    No results found for: \"TROPHS\"    Last CMP 11/2024 at [facility] unremarkable, no CBC, lipid panel there, no BNP drawn.    Diagnostic Studies:   EKG: NSR, LAHB, borderline prolonged QT    No echocardiogram results found for the past 12 months    No nuclear medicine results found for the past 12 " months    No valid procedures specified.    Radiology:     No valid procedures specified.   CT chest at [facility]  Multifocal at least moderate air trapping in all lung lobes with mild   excessive dynamic collapse of the mainstem bronchi (as well as the distal   trachea) and diffuse bronchial wall thickening in both lungs during   expiration.     A few scattered thin-walled lung cysts without appreciable mural nodules   again noted in the bilateral mid to lower lung zones.  No evidence of   fibrotic interstitial lung disease.     No suspicious lung nodules and no thoracic lymphadenopathy.   No CM, no coronary calcifications, no pericardial effusion    Stable 1.0 cm left adrenal nodule, likely benign.   Assessment:     Problem List Items Addressed This Visit       Shortness of breath - Primary    Relevant Orders    ECG 12 lead (Clinic Performed) (Completed)    CBC and Auto Differential    B-Type Natriuretic Peptide    Transthoracic echo (TTE) complete    Follow Up In Cardiology    Atypical chest pain    Relevant Orders    Comprehensive Metabolic Panel    Lipid Panel    Transthoracic echo (TTE) complete    Follow Up In Cardiology    Palpitations    Relevant Orders    TSH with reflex to Free T4 if abnormal    Magnesium    Transthoracic echo (TTE) complete    Chronic cough    Relevant Orders    CBC and Auto Differential    Multiple idiopathic pulmonary cysts    Relevant Orders    Alpha-1-Antitrypsin    Essential (primary) hypertension    Relevant Medications    losartan-hydrochlorothiazide (Hyzaar) 50-12.5 mg tablet    Other Relevant Orders    Follow Up In Cardiology       Plan:   1.  Shortness of breath.  Her shortness of breath appears to be more pulmonary related than signs or symptoms of heart failure.  We have requested an echocardiogram and we have encouraged the patient to continue to work with her pulmonologist.  We have requested a BN peptide for when she has her labs drawn as well as a CBC with differential to see  if she has any significant anemia or if her blood test for heart failure is elevated.  We have requested an echo for this as well as other reasons to look at her LV structure and function.  2.  Atypical chest pain.  Her chest discomfort is not suggestive of angina pectoris.  Although her father had heart disease it was not at a very young age.  She had a CT scan of the chest at the [facility] that showed no coronary calcifications so for now given she is under the age of 52 I have not requested a CT cardiac score but we will check her lipid panel for assessment of other cardiovascular risk factors that may need to be addressed.  3.  Chronic cough.  Likely related to her pulmonary disease and possible asthma have deferred to her primary care physician.  She is intermittently productive of dark-colored sputum and her CT scan showed signs of bronchiectasis.  I have referred her back to her PCP as well as pulmonologist for any alterations in her chronic respiratory therapy.  4.  Palpitations.  Benign by description.  For now would follow her clinically if they persist or sound associated with dizziness or lightheadedness would consider a 24 to 48-hour Holter monitor.  5.  Hypertension.  Blood pressure was repeated and remained elevated.  We have started her on an ARB with hydrochlorothiazide and we have told her to make sure she has her labs drawn within 2 weeks of starting this medication so we can assess her renal function and her electrolytes.  She has no old labs done in the system and she is told it is very important that she have her laboratory evaluation for her multiple problems done as well.  6.  Multiple idiopathic pulmonary cysts.  This is concerning for the possibility of alpha 1 antitrypsin deficiency.  I have requested a level.  I have encouraged her to discuss these other issues again with her pulmonologist.    I have encouraged the patient to have her echo done as soon as possible and have her labs  drawn within the next 7 to 10 days.  She has been instructed to start the losartan with hydrochlorothiazide for antihypertensive control.  I have asked her to see me in follow-up after the echocardiogram and having her lab work done and we will reassess her symptoms and her blood pressure readings at that time.  In terms of her anxiety and multiple other complaints she has been referred back to her psychiatrist and primary care physician.  She is on multiple psychiatrically active medications in a patient with a history of known multisubstance abuse in the past.  Would be cautious with this and her addictive personality.         [1]   Allergies  Allergen Reactions    Bupropion Other and Rash     Other reaction(s): Mental Status Change      Other Reaction(s): Mental Status Change    Other reaction(s): Mental Status Change      Other reaction(s): Mental Status Change  Other Reaction(s): Mental Status Change    Bupropion Hcl Rash     Other reaction(s): Mental Status Change    Lidocaine Anaphylaxis, Other and Rash     Other Reaction(s): Dyspnea, Rash    Metoclopramide Other     Other reaction(s): Other   Other reaction(s): Dystonia    Other reaction(s): severe pain    Other Reaction(s): dystonic reaction, Other, Other (See Comments), severe pain      Other reaction(s): Other Other reaction(s): Dystonia      Other reaction(s): severe pain    Sulfamethoxazole-Trimethoprim Itching and Nausea/vomiting    Clomipramine Unknown     Palpitations, nausea     Palpitations, nausea    Desmopressin Other    Naproxen GI Upset     Other Reaction(s): GI Upset    Pregabalin Other     Other reaction(s): GI Upset    Duloxetine Anxiety     Increased agitation    Lamotrigine Rash     Rsh on arms   [2] No past medical history on file.  [3]   Social History  Socioeconomic History    Marital status:    Tobacco Use    Smoking status: Every Day     Current packs/day: 0.50     Types: Cigarettes    Smokeless tobacco: Never     Social  Drivers of Health     Financial Resource Strain: At Risk (2024)    Received from [employer]    Financial Resource Strain     Financial Resource Strain: 2   Food Insecurity: No Food Insecurity (2025)    Hunger Vital Sign     Worried About Running Out of Food in the Last Year: Never true     Ran Out of Food in the Last Year: Never true   Transportation Needs: Not at Risk (2024)    Received from [facility]    Transportation Needs     Transportation: 1   Physical Activity: Patient Declined (2023)    Received from [facility]    Exercise Vital Sign     Days of Exercise per Week: Patient declined     Minutes of Exercise per Session: Patient declined   Stress: At Risk (2024)    Received from [facility]    Stress     Stress: 2   Social Connections: At Risk (2024)    Received from [facility]    Social Connections     Connectedness: 2   Intimate Partner Violence: Patient Declined (2023)    Received from [facility]    Humiliation, Afraid, Rape, and Kick questionnaire     Fear of Current or Ex-Partner: Patient declined     Emotionally Abused: Patient declined     Physically Abused: Patient declined     Sexually Abused: Patient declined   Housing Stability: At Risk (2024)    Received from [facility]    Housing Stability     Housin   [4] No family history on file.

## 2025-05-29 NOTE — PATIENT INSTRUCTIONS
Start losartan with hydrochlorothiazide 50/12.5 mg daily  Get labs drawn in next week  Get echo    Follow-up after echo and labs.

## 2025-06-08 DIAGNOSIS — M54.12 CERVICAL RADICULOPATHY: ICD-10-CM

## 2025-06-08 DIAGNOSIS — M54.2 NECK PAIN: ICD-10-CM

## 2025-06-09 RX ORDER — GABAPENTIN 400 MG/1
400 CAPSULE ORAL EVERY 6 HOURS
Qty: 120 CAPSULE | Refills: 1 | Status: SHIPPED | OUTPATIENT
Start: 2025-06-09 | End: 2025-08-08

## 2025-06-17 ENCOUNTER — APPOINTMENT (OUTPATIENT)
Dept: CARDIOLOGY | Facility: CLINIC | Age: 45
End: 2025-06-17
Payer: COMMERCIAL

## 2025-07-03 ENCOUNTER — APPOINTMENT (OUTPATIENT)
Dept: CARDIOLOGY | Facility: CLINIC | Age: 45
End: 2025-07-03
Payer: COMMERCIAL